# Patient Record
Sex: MALE | NOT HISPANIC OR LATINO | URBAN - METROPOLITAN AREA
[De-identification: names, ages, dates, MRNs, and addresses within clinical notes are randomized per-mention and may not be internally consistent; named-entity substitution may affect disease eponyms.]

---

## 2017-12-29 ENCOUNTER — EMERGENCY (EMERGENCY)
Facility: HOSPITAL | Age: 27
LOS: 1 days | Discharge: LEFT BEFORE DISCHARGE | End: 2017-12-29
Attending: EMERGENCY MEDICINE | Admitting: EMERGENCY MEDICINE

## 2017-12-29 VITALS
DIASTOLIC BLOOD PRESSURE: 98 MMHG | OXYGEN SATURATION: 99 % | HEART RATE: 82 BPM | RESPIRATION RATE: 15 BRPM | TEMPERATURE: 98 F | SYSTOLIC BLOOD PRESSURE: 138 MMHG

## 2017-12-29 NOTE — ED PROVIDER NOTE - OBJECTIVE STATEMENT
27M with no PMH presenting with RUQ pain x2 days. States that pain is intermittent and is not present right now. Does not endorse any aggravating factors, not associated with food, no recent trauma or heavy lifting. Has not taken anything for the pain thus far. No similar episodes in the past. No surgical hx. Denies fever, chills, cp, sob, n/v/d, cough, weakness, dizziness, dysuria, constipation 27M with no PMH presenting with RUQ pain x2 days. States that pain is intermittent and is not present right now. Does not endorse any aggravating factors, not associated with food, no recent trauma or heavy lifting. Has not taken anything for the pain thus far. No similar episodes in the past. No surgical hx. Denies fever, chills, cp, sob, n/v/d, cough, weakness, dizziness, dysuria, constipation.    02:50 Pham att: Patient not seen by me. ED LOS 1:40 Informed by ED RN [place] patient not in room. Patient seen by resident, then eloped. Per RN and Resident Physician patient was AAOX3, not intoxicated.

## 2017-12-29 NOTE — ED PROVIDER NOTE - ATTENDING CONTRIBUTION TO CARE
02:50 Pham att: Patient not seen by me. ED LOS 1:40 Informed by ED RN [place] patient not in room. Patient seen by resident, then eloped. Per RN and Resident Physician patient was AAOX3, not intoxicated.

## 2017-12-29 NOTE — ED ADULT TRIAGE NOTE - CHIEF COMPLAINT QUOTE
Pt c/o RUQ pain starting two days ago.  Denies any nausea/vomiting.  Denies any urinary symptoms.  Denies any SOB/chest pain.  Denies any PMHx.

## 2017-12-29 NOTE — ED PROVIDER NOTE - MEDICAL DECISION MAKING DETAILS
27M no pmh presenting with RUQ pain x 2 days intermittent, currently not having pain. Physical exam benign with no abdominal tenderness, negative brice. Likely biliary colic vs msk pain. Will order cbc, cmp, lipase, reassess with consideration for RUQ US.

## 2017-12-29 NOTE — ED ADULT NURSE NOTE - OBJECTIVE STATEMENT
pt aox3; introduced myself to pt. Reported right upper quadrant pain in abdomen.  Was seen by resident.  When going back to complete assessment/ obtain blood work pt was not in room. Called overhead x3. Pt eloped with family member. MD made aware. Charge RN made aware

## 2018-04-27 ENCOUNTER — EMERGENCY (EMERGENCY)
Facility: HOSPITAL | Age: 28
LOS: 1 days | Discharge: HOME | End: 2018-04-27
Admitting: EMERGENCY MEDICINE
Payer: MEDICAID

## 2018-04-27 VITALS
SYSTOLIC BLOOD PRESSURE: 129 MMHG | RESPIRATION RATE: 16 BRPM | TEMPERATURE: 98 F | OXYGEN SATURATION: 97 % | DIASTOLIC BLOOD PRESSURE: 81 MMHG | HEART RATE: 74 BPM

## 2018-04-27 PROCEDURE — 99283 EMERGENCY DEPT VISIT LOW MDM: CPT | Mod: 25

## 2018-04-27 PROCEDURE — 10061 I&D ABSCESS COMP/MULTIPLE: CPT

## 2018-04-27 RX ADMIN — Medication 1 TABLET(S): at 22:53

## 2018-04-27 NOTE — ED ADULT TRIAGE NOTE - CHIEF COMPLAINT QUOTE
Pt c/o bump to top of head x 1 wk. Pt states intermit watery dc. Denies all other adverse symptoms at this time.

## 2018-04-27 NOTE — ED PROVIDER NOTE - PROGRESS NOTE DETAILS
BRETT Roldan: pt here for cyst to scalp, clear drainage noted at home, I&D with needle performed in ID with minimal discharge/drainage. Will d/c home with abx and derm fu for possible cyst excision. VSS.

## 2018-04-27 NOTE — ED PROVIDER NOTE - OBJECTIVE STATEMENT
28 y.o male presents with a cyst to his scalp. He reports the "bump" has been worsening and noted watery discharge from site. Denies trauma, injury to skin, fever, chills, headache, dizziness, redness. Non distressed, non toxic.

## 2018-04-27 NOTE — ED PROVIDER NOTE - CARE PLAN
Principal Discharge DX:	Cyst of skin  Assessment and plan of treatment:	Warm compresses  Begin medication as prescribed  follow up with dermatology as needed

## 2018-05-31 PROBLEM — Z00.00 ENCOUNTER FOR PREVENTIVE HEALTH EXAMINATION: Status: ACTIVE | Noted: 2018-05-31

## 2018-06-01 ENCOUNTER — APPOINTMENT (OUTPATIENT)
Dept: DERMATOLOGY | Facility: CLINIC | Age: 28
End: 2018-06-01

## 2022-08-19 NOTE — ED PROVIDER NOTE - LOCATION
Pt A&Ox3, resting in bed, reports feeling better. Pt remain hypotensive. Dr. Ramsey at bedside, aware of medication orders and need for additional unit of PRBC, at beside to prepare for central line insertion.   RUQ